# Patient Record
Sex: MALE | Race: WHITE | NOT HISPANIC OR LATINO | URBAN - METROPOLITAN AREA
[De-identification: names, ages, dates, MRNs, and addresses within clinical notes are randomized per-mention and may not be internally consistent; named-entity substitution may affect disease eponyms.]

---

## 2017-04-17 ENCOUNTER — COMMUNICATION - HEALTHEAST (OUTPATIENT)
Dept: TELEHEALTH | Facility: CLINIC | Age: 27
End: 2017-04-17

## 2017-04-17 ENCOUNTER — OFFICE VISIT - HEALTHEAST (OUTPATIENT)
Dept: INTERNAL MEDICINE | Facility: CLINIC | Age: 27
End: 2017-04-17

## 2017-04-17 DIAGNOSIS — K52.9 CHRONIC DIARRHEA: ICD-10-CM

## 2017-04-17 DIAGNOSIS — R10.30 LOWER ABDOMINAL PAIN: ICD-10-CM

## 2017-04-17 ASSESSMENT — MIFFLIN-ST. JEOR: SCORE: 1795.4

## 2021-05-30 VITALS — BODY MASS INDEX: 26.48 KG/M2 | HEIGHT: 70 IN | WEIGHT: 185 LBS

## 2021-06-10 NOTE — PROGRESS NOTES
Office Visit - Follow Up   Cristofer Gatica   26 y.o. male    Date of Visit: 4/17/2017    Chief Complaint   Patient presents with     Hypertension     Abdominal Pain     Diarrhea        Assessment and Plan   1. Chronic diarrhea with lower abdominal pain  We discussed likely etiologies including irritable bowel syndrome.  Cannot exclude celiac disease or inflammatory bowel disease.  Will check labs.  He will first try adding some fiber using 1 teaspoon of Metamucil daily.  He will also try some probiotics.  If symptoms persist, consider GI evaluation.      - HM2(CBC w/o Differential)  - Comprehensive Metabolic Panel  - Sedimentation Rate  - C-Reactive Protein(CRP)    3. Elevated blood pressure  Reassurance that blood pressure looks reasonably well controlled today.  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  We also discussed the importance of restricting his sodium intake.  Switching to cooking more at home rather than eating out would be very helpful.    No Follow-up on file.     History of Present Illness   This 26 y.o. old male who is new to our office here to establish care and to discuss concerns with elevated blood pressure along with recurrent episodes of diarrhea and abdominal pain.  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  No previous history of problems with blood pressure.  Overall his health has been excellent.  He found his blood pressure to be high at 160/95 at a local pharmacy. He also found it elevated several weeks ago.  He recalls systolic 170.  He did not sit and rest for any time prior to checking on these occasions.  There is some family history of hypertension.  His father with the diagnosis but this was later in life.  Otherwise feeling well and has no symptoms of headaches, chest pain, or palpitations.  No problems with peripheral edema.  He does  "not add salt but does tend to eat out quite a bit.  He went out for a steak the night before checking his blood pressure recently.  Has gained some weight during his school.  Had got away from exercise but is doing a better job of this lately.  We also discussed some GI symptoms he has been experiencing over the last year.  About once weekly, he will have loose stools associated with some vague abdominal pain or cramping.  The rest of the week his bowels are regular.  No blood has been seen.  No unexpected weight loss.  No fevers or chills.  No family history of inflammatory bowel disease.  He tried discontinuing dairy but this did not make a difference.  No history of celiac disease.  He cannot relate his symptoms to any specific food but has not watched very closely either.    Review of Systems:  Otherwise, a comprehensive review of systems was negative except as noted.     Medications, Allergies and Problem List   Patient Active Problem List   Diagnosis     Chronic diarrhea     Lower abdominal pain     Elevated blood pressure       He has a past surgical history that includes [place] tooth extraction and Shoulder surgery.    No Known Allergies    No current outpatient prescriptions on file.     No current facility-administered medications for this visit.         Physical Exam   General Appearance:   Well-appearing young male    /84  Pulse 85  Ht 5' 10\" (1.778 m)  Wt 185 lb (83.9 kg)  SpO2 100%  BMI 26.54 kg/m2    HEENT: Normal  Respiratory: Normal respiratory effort.  Lungs are clear with no rales or wheezes.  Heart: Regular rate and rhythm without murmurs, rubs, or gallops.    Abdomen: Abdomen is soft, nontender without guarding, rebound, masses, or hepatosplenomegaly.  Extremities: No peripheral edema.  Neurologic: Grossly nonfocal  Skin: No cyanosis or pallor  Psych: Alert and oriented ×3, mood appropriate         Additional Information   Social History   Substance Use Topics     Smoking status: " Never Smoker     Smokeless tobacco: None     Alcohol use Yes      Comment: 5/ week           total time spent with the patient was 30 minutes of which >50% was spent in counseling and coordination of care     Pierre Vidal MD

## 2021-06-15 PROBLEM — R10.30 LOWER ABDOMINAL PAIN: Status: ACTIVE | Noted: 2017-04-17

## 2021-06-15 PROBLEM — K52.9 CHRONIC DIARRHEA: Status: ACTIVE | Noted: 2017-04-17
